# Patient Record
Sex: MALE | Race: WHITE | NOT HISPANIC OR LATINO
[De-identification: names, ages, dates, MRNs, and addresses within clinical notes are randomized per-mention and may not be internally consistent; named-entity substitution may affect disease eponyms.]

---

## 2019-02-18 ENCOUNTER — NEW REFERRAL (OUTPATIENT)
Age: 39
End: 2019-02-18

## 2019-02-18 DIAGNOSIS — H35.712: ICD-10-CM

## 2019-02-18 PROCEDURE — 92134 CPTRZ OPH DX IMG PST SGM RTA: CPT

## 2019-02-18 PROCEDURE — 92004 COMPRE OPH EXAM NEW PT 1/>: CPT

## 2019-02-18 PROCEDURE — 92250 FUNDUS PHOTOGRAPHY W/I&R: CPT

## 2019-02-18 ASSESSMENT — VISUAL ACUITY
OS_PH: 20/25
OD_SC: 20/20
OS_SC: 20/40-1

## 2019-02-18 ASSESSMENT — TONOMETRY
OS_IOP_MMHG: 18
OD_IOP_MMHG: 15

## 2019-04-08 ENCOUNTER — FOLLOW UP (OUTPATIENT)
Age: 39
End: 2019-04-08

## 2019-04-08 DIAGNOSIS — H35.712: ICD-10-CM

## 2019-04-08 PROCEDURE — 92250 FUNDUS PHOTOGRAPHY W/I&R: CPT

## 2019-04-08 PROCEDURE — 92014 COMPRE OPH EXAM EST PT 1/>: CPT

## 2019-04-08 PROCEDURE — 92134 CPTRZ OPH DX IMG PST SGM RTA: CPT

## 2019-04-08 ASSESSMENT — VISUAL ACUITY
OS_SC: 20/25-2
OD_SC: 20/20

## 2019-04-08 ASSESSMENT — TONOMETRY
OS_IOP_MMHG: 17
OD_IOP_MMHG: 19

## 2025-03-30 ENCOUNTER — APPOINTMENT (EMERGENCY)
Dept: RADIOLOGY | Facility: HOSPITAL | Age: 45
End: 2025-03-30
Attending: EMERGENCY MEDICINE
Payer: COMMERCIAL

## 2025-03-30 ENCOUNTER — HOSPITAL ENCOUNTER (EMERGENCY)
Facility: HOSPITAL | Age: 45
Discharge: HOME | End: 2025-03-30
Attending: EMERGENCY MEDICINE | Admitting: EMERGENCY MEDICINE
Payer: COMMERCIAL

## 2025-03-30 VITALS
HEIGHT: 76 IN | RESPIRATION RATE: 15 BRPM | WEIGHT: 214 LBS | TEMPERATURE: 98.1 F | HEART RATE: 106 BPM | DIASTOLIC BLOOD PRESSURE: 117 MMHG | OXYGEN SATURATION: 100 % | BODY MASS INDEX: 26.06 KG/M2 | SYSTOLIC BLOOD PRESSURE: 187 MMHG

## 2025-03-30 DIAGNOSIS — I10 HYPERTENSION, UNSPECIFIED TYPE: Primary | ICD-10-CM

## 2025-03-30 DIAGNOSIS — F10.10 ETOH ABUSE: ICD-10-CM

## 2025-03-30 LAB
ALBUMIN SERPL-MCNC: 4.7 G/DL (ref 3.5–5.7)
ALP SERPL-CCNC: 78 IU/L (ref 34–125)
ALT SERPL-CCNC: 21 IU/L (ref 7–52)
AMPHET UR QL SCN: NOT DETECTED
ANION GAP SERPL CALC-SCNC: 13 MEQ/L (ref 3–15)
AST SERPL-CCNC: 45 IU/L (ref 13–39)
ATRIAL RATE: 89
BARBITURATES UR QL SCN: NOT DETECTED
BASOPHILS # BLD: 0.03 K/UL (ref 0.01–0.1)
BASOPHILS NFR BLD: 0.7 %
BENZODIAZ UR QL SCN: NOT DETECTED
BILIRUB SERPL-MCNC: 0.8 MG/DL (ref 0.3–1.2)
BUN SERPL-MCNC: 12 MG/DL (ref 7–25)
CALCIUM SERPL-MCNC: 9.4 MG/DL (ref 8.6–10.3)
CANNABINOIDS UR QL SCN: NOT DETECTED
CHLORIDE SERPL-SCNC: 99 MEQ/L (ref 98–107)
CO2 SERPL-SCNC: 24 MEQ/L (ref 21–31)
COCAINE UR QL SCN: NOT DETECTED
CREAT SERPL-MCNC: 0.9 MG/DL (ref 0.7–1.3)
DIFFERENTIAL METHOD BLD: ABNORMAL
EGFRCR SERPLBLD CKD-EPI 2021: >60 ML/MIN/1.73M*2
EOSINOPHIL # BLD: 0 K/UL (ref 0.04–0.54)
EOSINOPHIL NFR BLD: 0 %
ERYTHROCYTE [DISTWIDTH] IN BLOOD BY AUTOMATED COUNT: 11.9 % (ref 11.6–14.4)
ETHANOL SERPL-MCNC: 120 MG/DL
FENTANYL CTO UR SCN-MCNC: NOT DETECTED NG/ML
GLUCOSE SERPL-MCNC: 83 MG/DL (ref 70–99)
HCT VFR BLD AUTO: 39.8 % (ref 40.1–51)
HGB BLD-MCNC: 14.7 G/DL (ref 13.7–17.5)
IMM GRANULOCYTES # BLD AUTO: 0.01 K/UL (ref 0–0.08)
IMM GRANULOCYTES NFR BLD AUTO: 0.2 %
LYMPHOCYTES # BLD: 1.07 K/UL (ref 1.2–3.5)
LYMPHOCYTES NFR BLD: 25.2 %
MCH RBC QN AUTO: 33.2 PG (ref 28–33.2)
MCHC RBC AUTO-ENTMCNC: 36.9 G/DL (ref 32.2–36.5)
MCV RBC AUTO: 89.8 FL (ref 83–98)
MONOCYTES # BLD: 0.36 K/UL (ref 0.3–1)
MONOCYTES NFR BLD: 8.5 %
NEUTROPHILS # BLD: 2.78 K/UL (ref 1.7–7)
NEUTS SEG NFR BLD: 65.4 %
NRBC BLD-RTO: 0 %
OPIATES UR QL SCN: NOT DETECTED
P AXIS: 18
PCP UR QL SCN: NOT DETECTED
PLATELET # BLD AUTO: 207 K/UL (ref 150–350)
PMV BLD AUTO: 9.9 FL (ref 9.4–12.4)
POTASSIUM SERPL-SCNC: 3.4 MEQ/L (ref 3.5–5.1)
PR INTERVAL: 140
PROT SERPL-MCNC: 7.9 G/DL (ref 6–8.2)
QRS DURATION: 92
QT INTERVAL: 372
QTC CALCULATION(BAZETT): 452
R AXIS: 39
RBC # BLD AUTO: 4.43 M/UL (ref 4.5–5.8)
SODIUM SERPL-SCNC: 136 MEQ/L (ref 136–145)
T WAVE AXIS: 29
TROPONIN I SERPL HS-MCNC: 4.3 PG/ML
TROPONIN I SERPL HS-MCNC: 4.8 PG/ML
VENTRICULAR RATE: 89
WBC # BLD AUTO: 4.25 K/UL (ref 3.8–10.5)

## 2025-03-30 PROCEDURE — 96374 THER/PROPH/DIAG INJ IV PUSH: CPT

## 2025-03-30 PROCEDURE — 80307 DRUG TEST PRSMV CHEM ANLYZR: CPT | Performed by: EMERGENCY MEDICINE

## 2025-03-30 PROCEDURE — 63700000 HC SELF-ADMINISTRABLE DRUG: Performed by: EMERGENCY MEDICINE

## 2025-03-30 PROCEDURE — 99284 EMERGENCY DEPT VISIT MOD MDM: CPT | Mod: 25

## 2025-03-30 PROCEDURE — 71045 X-RAY EXAM CHEST 1 VIEW: CPT

## 2025-03-30 PROCEDURE — 3E033GC INTRODUCTION OF OTHER THERAPEUTIC SUBSTANCE INTO PERIPHERAL VEIN, PERCUTANEOUS APPROACH: ICD-10-PCS | Performed by: EMERGENCY MEDICINE

## 2025-03-30 PROCEDURE — 85025 COMPLETE CBC W/AUTO DIFF WBC: CPT | Performed by: EMERGENCY MEDICINE

## 2025-03-30 PROCEDURE — 63600000 HC DRUGS/DETAIL CODE: Mod: JZ | Performed by: EMERGENCY MEDICINE

## 2025-03-30 PROCEDURE — 36415 COLL VENOUS BLD VENIPUNCTURE: CPT | Performed by: EMERGENCY MEDICINE

## 2025-03-30 PROCEDURE — 80053 COMPREHEN METABOLIC PANEL: CPT | Performed by: EMERGENCY MEDICINE

## 2025-03-30 PROCEDURE — G0480 DRUG TEST DEF 1-7 CLASSES: HCPCS | Performed by: EMERGENCY MEDICINE

## 2025-03-30 PROCEDURE — 84484 ASSAY OF TROPONIN QUANT: CPT | Performed by: EMERGENCY MEDICINE

## 2025-03-30 PROCEDURE — 96376 TX/PRO/DX INJ SAME DRUG ADON: CPT

## 2025-03-30 PROCEDURE — 93005 ELECTROCARDIOGRAM TRACING: CPT | Performed by: EMERGENCY MEDICINE

## 2025-03-30 PROCEDURE — 84484 ASSAY OF TROPONIN QUANT: CPT | Mod: 91 | Performed by: EMERGENCY MEDICINE

## 2025-03-30 RX ORDER — AMLODIPINE BESYLATE 5 MG/1
5 TABLET ORAL ONCE
Status: COMPLETED | OUTPATIENT
Start: 2025-03-30 | End: 2025-03-30

## 2025-03-30 RX ORDER — DIAZEPAM 5 MG/1
5 TABLET ORAL ONCE
Status: COMPLETED | OUTPATIENT
Start: 2025-03-30 | End: 2025-03-30

## 2025-03-30 RX ORDER — HYDRALAZINE HYDROCHLORIDE 20 MG/ML
10 INJECTION INTRAMUSCULAR; INTRAVENOUS ONCE
Status: COMPLETED | OUTPATIENT
Start: 2025-03-30 | End: 2025-03-30

## 2025-03-30 RX ORDER — LORAZEPAM 1 MG/1
2 TABLET ORAL ONCE
Status: COMPLETED | OUTPATIENT
Start: 2025-03-30 | End: 2025-03-30

## 2025-03-30 RX ORDER — AMLODIPINE AND BENAZEPRIL HYDROCHLORIDE 5; 10 MG/1; MG/1
1 CAPSULE ORAL DAILY
Qty: 30 CAPSULE | Refills: 0 | Status: SHIPPED | OUTPATIENT
Start: 2025-03-31 | End: 2025-04-05

## 2025-03-30 RX ORDER — CLONIDINE HYDROCHLORIDE 0.1 MG/1
0.1 TABLET ORAL ONCE
Status: COMPLETED | OUTPATIENT
Start: 2025-03-30 | End: 2025-03-30

## 2025-03-30 RX ADMIN — HYDRALAZINE HYDROCHLORIDE 10 MG: 20 INJECTION INTRAMUSCULAR; INTRAVENOUS at 13:28

## 2025-03-30 RX ADMIN — LORAZEPAM 2 MG: 1 TABLET ORAL at 18:14

## 2025-03-30 RX ADMIN — CLONIDINE HYDROCHLORIDE 0.1 MG: 0.1 TABLET ORAL at 15:28

## 2025-03-30 RX ADMIN — AMLODIPINE BESYLATE 5 MG: 5 TABLET ORAL at 13:27

## 2025-03-30 RX ADMIN — HYDRALAZINE HYDROCHLORIDE 10 MG: 20 INJECTION INTRAMUSCULAR; INTRAVENOUS at 17:01

## 2025-03-30 RX ADMIN — DIAZEPAM 5 MG: 5 TABLET ORAL at 21:03

## 2025-03-30 NOTE — ED PROVIDER NOTES
Emergency Medicine Note  HPI   HISTORY OF PRESENT ILLNESS     45 y/o male, went to check into [facility] for ETOH rehab and BP was high.  Pt. Has not taken his meds for over a year.  Mild HA earlier, not now.  No cp or sob. No seizures.      Last drink this morning.      No abd pain, v/d.      Pt. Not sure what he was taking for BP in past.           Patient History   PAST HISTORY     Reviewed from Nursing Triage:       Past Medical History:   Diagnosis Date    Alcoholism (CMS/HCC)     Hypertension     Seizure due to alcohol withdrawal, uncomplicated (CMS/HCC)        No past surgical history on file.    No family history on file.    Social History     Tobacco Use    Smoking status: Never    Smokeless tobacco: Never   Substance Use Topics    Alcohol use: Yes     Alcohol/week: 56.0 standard drinks of alcohol     Types: 56 Shots of liquor per week     Comment: estimated, 3-4 drinks daily hat are 50/50rum and coke    Drug use: Not Currently         Review of Systems   REVIEW OF SYSTEMS     Review of Systems      VITALS     ED Vitals      Date/Time Temp Pulse Resp BP SpO2 Who   03/30/25 2033 -- 106 15 187/117 100 % JAG   03/30/25 1738 -- 96 26 164/103 99 % PRH   03/30/25 1634 -- 87 14 176/112 99 % PRH   03/30/25 1504 -- 89 13 182/117 100 % PRH   03/30/25 1404 -- 89 17 189/123 99 % PRH   03/30/25 1334 -- 88 24 198/133 99 % PRH   03/30/25 1253 36.7 °C (98.1 °F) 94 16 233/141 100 % CM          Pulse Ox %: 100 % (03/30/25 1420)  Pulse Ox Interpretation: Normal (03/30/25 1420)           Physical Exam   PHYSICAL EXAM     Physical Exam  Vitals and nursing note reviewed.   Constitutional:       Appearance: He is well-developed.   HENT:      Head: Normocephalic and atraumatic.   Eyes:      Conjunctiva/sclera: Conjunctivae normal.   Cardiovascular:      Rate and Rhythm: Normal rate and regular rhythm.   Pulmonary:      Effort: Pulmonary effort is normal.      Breath sounds: Normal breath sounds.   Abdominal:      General: There is no  distension.      Palpations: Abdomen is soft. There is no mass.      Tenderness: There is no abdominal tenderness.   Musculoskeletal:         General: No tenderness or deformity. Normal range of motion.      Cervical back: Normal range of motion.   Skin:     General: Skin is warm and dry.   Neurological:      Mental Status: He is alert. Mental status is at baseline.   Psychiatric:         Behavior: Behavior normal.           PROCEDURES     Procedures     DATA     Results       Procedure Component Value Units Date/Time    Urine drug screen (UDS) [[id number]]  (Normal) Collected: 03/30/25 1530    Specimen: Urine, Clean Catch Updated: 03/30/25 1609     PCP Scrn, Ur Not Detected     Comment: Assay Detects: phencyclidine in urine. Lowest detectable concentration is 25 ng/mL of phencyclidine.        Benzodiazepine Ur Qual Not Detected     Comment: Assay Detects: benzodiazepines and metabolites at varying concentrations. Lowest detectable concentration is 200 ng/mL of oxazepam.        Cocaine Screen, Urine Not Detected     Comment: Assay Detects: benzoylecgonine and cocaine in urine. Lowest detectable concentration is 300 ng/mL of benzoylecgonine.        Amphetamine+Methamphetamine Screen, Ur Not Detected     Comment: Assay Detects: d-methamphetamine, d-amphetamine, methlyenedioxyamphetamine (MDA), and methlyenendioxymethamphetamine (MDMA) in urine. Lowest detectable concentration is 1000 ng/mL of d-methamphetamine.<br>Assay is less sensitive to MDA and MDMA (lowest detectable concentration, 2500 ng/mL) and could produce a false negative result. If MDMA overdose is suspected and the result is negative, a more specific test should be requested.        Cannabinoid Screen, Urine Not Detected     Comment: Assay Detects: cannabinoid metabolites in urine. Lowest detectable concentration is 50 ng/mL        Opiate Scrn, Ur Not Detected     Comment: Assay Detects: codeine, dihydrocodeine, hydrocodone, hydromorphone, levorphanol,  morphine, morphine-3-glucuronide, norcodeine, oxycodone in urine. Lowest detectable concentration is 300 ng/mL of morphine.        Barbiturate Screen, Ur Not Detected     Comment: Assay Detects: alphenal, amobarbital, aprobarbital, barbital, butabarbital, butalbital, butethal, diallybarbital, pentobarbital, secobarbital,talbutal, and thiopental. Lowest detectable concentration is 200 ng/mL of secobarbital.        Fentanyl Screen, Urine Not Detected     Comment: Assay Detects: fentanyl metabolite in urine, lowest detectable concentration is 5 ng/mL of norfentanyl.       HS Troponin I (with 2 hour reflex) [[id number]]  (Normal) Collected: 03/30/25 1309    Specimen: Blood, Venous Updated: 03/30/25 1345     High Sens Troponin I 4.3 pg/mL     Comprehensive metabolic panel [[id number]]  (Abnormal) Collected: 03/30/25 1309    Specimen: Blood, Venous Updated: 03/30/25 1341     Sodium 136 mEQ/L      Potassium 3.4 mEQ/L      Comment: Results obtained on plasma. Plasma Potassium values may be up to 0.4 mEQ/L less than serum values. The differences may be greater for patients with high platelet or white cell counts.        Chloride 99 mEQ/L      CO2 24 mEQ/L      BUN 12 mg/dL      Creatinine 0.9 mg/dL      Glucose 83 mg/dL      Calcium 9.4 mg/dL      AST (SGOT) 45 IU/L      ALT (SGPT) 21 IU/L      Alkaline Phosphatase 78 IU/L      Total Protein 7.9 g/dL      Comment: Test performed on plasma which typically contains approximately 0.4 g/dL more protein than serum.        Albumin 4.7 g/dL      Bilirubin, Total 0.8 mg/dL      eGFR >60.0 mL/min/1.73m*2      Comment: Calculation based on the Chronic Kidney Disease Epidemiology Collaboration (CKD-EPI) equation refit without adjustment for race.        Anion Gap 13 mEQ/L     Ethanol serum [[id number]]  (Abnormal) Collected: 03/30/25 1309    Specimen: Blood, Venous Updated: 03/30/25 1341     Ethanol 120 mg/dL     CBC and differential [[id number]]  (Abnormal) Collected: 03/30/25 1309     Specimen: Blood, Venous Updated: 03/30/25 1323     WBC 4.25 K/uL      RBC 4.43 M/uL      Hemoglobin 14.7 g/dL      Hematocrit 39.8 %      MCV 89.8 fL      MCH 33.2 pg      MCHC 36.9 g/dL      RDW 11.9 %      Platelets 207 K/uL      MPV 9.9 fL      Differential Type Auto     nRBC 0.0 %      Immature Granulocytes 0.2 %      Neutrophils 65.4 %      Lymphocytes 25.2 %      Monocytes 8.5 %      Eosinophils 0.0 %      Basophils 0.7 %      Immature Granulocytes, Absolute 0.01 K/uL      Neutrophils, Absolute 2.78 K/uL      Lymphocytes, Absolute 1.07 K/uL      Monocytes, Absolute 0.36 K/uL      Eosinophils, Absolute 0.00 K/uL      Basophils, Absolute 0.03 K/uL     RON GOLD [[id number]] Collected: 03/30/25 1309    Specimen: Blood, Venous Updated: 03/30/25 1317    [place] Draw Panel [[id number]] Collected: 03/30/25 1309    Specimen: Blood, Venous Updated: 03/30/25 1316    Narrative:      The following orders were created for panel order [place] Draw Panel.  Procedure                               Abnormality         Status                     ---------                               -----------         ------                     RAINBOW LT BLUE[[id number]]                                  In process                 RAINBOW INÉS[[id number]]                                     In process                   Please view results for these tests on the individual orders.    RON LT BLUE [[id number]] Collected: 03/30/25 1309    Specimen: Blood, Venous Updated: 03/30/25 1316            Imaging Results              X-RAY CHEST 1 VIEW (Final result)  Result time 03/30/25 13:42:07      Final result                   Impression:    IMPRESSION:    No active disease seen in the chest.               Narrative:    CLINICAL HISTORY: Chest pain and arrhythmia    COMPARISON: None    COMMENT:    A single view of the chest was obtained on 3/30/2025 at 13:16.    The heart and mediastinum are normal in size and contour. The lungs are clear.  No  pleural effusion, or pneumothorax is seen. The imaged osseous structures are  unremarkable for age.                                      ECG 12 lead   Independent Interpretation by ED Provider   Rhythm: [NSR]  Rate: 89  P waves: [normal interval]  QRS: [normal QRS]  Axis: [normal]  ST Segments: [no obvious ST elevation or ischemia]        ECG 12 lead          Scoring tools                                  ED Course & MDM   MDM / ED COURSE / CLINICAL IMPRESSION / DISPO     Medical Decision Making  Problems Addressed:  ETOH abuse: acute illness or injury  Hypertension, unspecified type: acute illness or injury    Amount and/or Complexity of Data Reviewed  Labs: ordered.  Radiology: ordered and independent interpretation performed.     Details: nad  ECG/medicine tests: ordered and independent interpretation performed. Decision-making details documented in ED Course.    Risk  Prescription drug management.        ED Course as of 03/31/25 1359   Sun Mar 30, 2025   1747 /103.  Pt. Without c/o.  No HA or CP.  Kidney function WNL.  Well appearing.  Will start on blood pressure meds but will need monitoring at [facility].     Pt. Also medicated with ativan for possible early withdrawal. [SM]      ED Course User Index  [SM] Patel Steen MD     Clinical Impression      Hypertension, unspecified type   ETOH abuse     _________________       ED Disposition   Discharge                       Patel Steen MD  03/31/25 1245